# Patient Record
Sex: MALE | Race: WHITE
[De-identification: names, ages, dates, MRNs, and addresses within clinical notes are randomized per-mention and may not be internally consistent; named-entity substitution may affect disease eponyms.]

---

## 2020-06-30 ENCOUNTER — HOSPITAL ENCOUNTER (EMERGENCY)
Dept: HOSPITAL 96 - M.ERS | Age: 80
Discharge: HOME | End: 2020-06-30
Payer: COMMERCIAL

## 2020-06-30 VITALS — DIASTOLIC BLOOD PRESSURE: 92 MMHG | SYSTOLIC BLOOD PRESSURE: 145 MMHG

## 2020-06-30 VITALS — WEIGHT: 144.09 LBS | HEIGHT: 73 IN | BODY MASS INDEX: 19.1 KG/M2

## 2020-06-30 DIAGNOSIS — G89.29: ICD-10-CM

## 2020-06-30 DIAGNOSIS — F17.210: ICD-10-CM
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
## 2020-06-30 DIAGNOSIS — E87.6: ICD-10-CM

## 2020-06-30 DIAGNOSIS — E83.42: ICD-10-CM
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
## 2020-06-30 DIAGNOSIS — S32.040A: Primary | ICD-10-CM

## 2020-06-30 DIAGNOSIS — Z88.0: ICD-10-CM

## 2020-06-30 DIAGNOSIS — J18.8: ICD-10-CM

## 2020-06-30 DIAGNOSIS — F10.10: ICD-10-CM
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
## 2020-06-30 DIAGNOSIS — I10: ICD-10-CM
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
## 2020-06-30 DIAGNOSIS — S10.83XA: ICD-10-CM
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
## 2020-06-30 LAB
ABSOLUTE BASOPHILS: 0 THOU/UL (ref 0–0.2)
ABSOLUTE EOSINOPHILS: 0 THOU/UL (ref 0–0.7)
ABSOLUTE MONOCYTES: 0.5 THOU/UL (ref 0–1.2)
ALBUMIN SERPL-MCNC: 2.7 G/DL (ref 3.4–5)
ALP SERPL-CCNC: 58 U/L (ref 46–116)
ALT SERPL-CCNC: 21 U/L (ref 30–65)
ANION GAP SERPL CALC-SCNC: 5 MMOL/L (ref 7–16)
APAP SERPL-MCNC: < 2 UG/ML (ref 10–30)
APTT BLD: 26.1 SECONDS (ref 25–31.3)
AST SERPL-CCNC: 36 U/L (ref 15–37)
BASOPHILS NFR BLD AUTO: 1.1 %
BILIRUB SERPL-MCNC: 1.3 MG/DL
BUN SERPL-MCNC: 9 MG/DL (ref 7–18)
CALCIUM SERPL-MCNC: 7.2 MG/DL (ref 8.5–10.1)
CHLORIDE SERPL-SCNC: 93 MMOL/L (ref 98–107)
CO2 SERPL-SCNC: 31 MMOL/L (ref 21–32)
CREAT SERPL-MCNC: 1.2 MG/DL (ref 0.6–1.3)
EOSINOPHIL NFR BLD: 0.6 %
ETHANOL SERPL-MCNC: < 10 MG/DL (ref ?–10)
GLUCOSE SERPL-MCNC: 135 MG/DL (ref 70–99)
GRANULOCYTES NFR BLD MANUAL: 73.5 %
HCT VFR BLD CALC: 30.9 % (ref 42–52)
HGB BLD-MCNC: 11.1 GM/DL (ref 14–18)
INR PPP: 1.2
LG PLATELETS BLD QL SMEAR: (no result)
LIPASE: 119 U/L (ref 73–393)
LYMPHOCYTES # BLD: 0.6 THOU/UL (ref 0.8–5.3)
LYMPHOCYTES NFR BLD AUTO: 13 %
MACROCYTES: (no result)
MAGNESIUM SERPL-MCNC: 0.9 MG/DL (ref 1.8–2.4)
MCH RBC QN AUTO: 40.6 PG (ref 26–34)
MCHC RBC AUTO-ENTMCNC: 35.9 G/DL (ref 28–37)
MCV RBC: 113 FL (ref 80–100)
MONOCYTES NFR BLD: 11.8 %
MPV: 7.3 FL. (ref 7.2–11.1)
NEUTROPHILS # BLD: 3.1 THOU/UL (ref 1.6–8.1)
NUCLEATED RBCS: 0 /100WBC
PHOSPHATE SERPL-MCNC: 2.7 MG/DL (ref 2.5–4.9)
PLATELET # BLD EST: ADEQUATE 10*3/UL
PLATELET COUNT*: 296 THOU/UL (ref 150–400)
POTASSIUM SERPL-SCNC: 3.1 MMOL/L (ref 3.5–5.1)
PROT SERPL-MCNC: 6.1 G/DL (ref 6.4–8.2)
PROTHROMBIN TIME: 12 SECONDS (ref 9.2–11.5)
RBC # BLD AUTO: 2.74 MIL/UL (ref 4.5–6)
RDW-CV: 14.9 % (ref 10.5–14.5)
SALICYLATES SERPL-MCNC: < 2.8 MG/DL (ref 2.8–20)
SODIUM SERPL-SCNC: 129 MMOL/L (ref 136–145)
WBC # BLD AUTO: 4.3 THOU/UL (ref 4–11)

## 2020-07-08 ENCOUNTER — HOSPITAL ENCOUNTER (INPATIENT)
Dept: HOSPITAL 96 - M.ERS | Age: 80
LOS: 12 days | Discharge: PSYCHIATRIC OR SUBSTANCE USE FACILITY | DRG: 70 | End: 2020-07-20
Attending: INTERNAL MEDICINE | Admitting: INTERNAL MEDICINE
Payer: COMMERCIAL

## 2020-07-08 VITALS — DIASTOLIC BLOOD PRESSURE: 101 MMHG | SYSTOLIC BLOOD PRESSURE: 179 MMHG

## 2020-07-08 VITALS — WEIGHT: 131.6 LBS | BODY MASS INDEX: 17.44 KG/M2 | HEIGHT: 72.99 IN

## 2020-07-08 VITALS — SYSTOLIC BLOOD PRESSURE: 160 MMHG | DIASTOLIC BLOOD PRESSURE: 97 MMHG

## 2020-07-08 DIAGNOSIS — R53.1: ICD-10-CM

## 2020-07-08 DIAGNOSIS — G89.29: ICD-10-CM
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
## 2020-07-08 DIAGNOSIS — I10: ICD-10-CM

## 2020-07-08 DIAGNOSIS — G93.41: Primary | ICD-10-CM

## 2020-07-08 DIAGNOSIS — E53.8: ICD-10-CM

## 2020-07-08 DIAGNOSIS — Z91.14: ICD-10-CM

## 2020-07-08 DIAGNOSIS — E83.42: ICD-10-CM

## 2020-07-08 DIAGNOSIS — Z87.891: ICD-10-CM

## 2020-07-08 DIAGNOSIS — F10.97: ICD-10-CM

## 2020-07-08 DIAGNOSIS — Z79.899: ICD-10-CM

## 2020-07-08 DIAGNOSIS — Z20.828: ICD-10-CM

## 2020-07-08 DIAGNOSIS — M48.02: ICD-10-CM

## 2020-07-08 DIAGNOSIS — M54.9: ICD-10-CM

## 2020-07-08 DIAGNOSIS — Z88.0: ICD-10-CM

## 2020-07-08 DIAGNOSIS — E43: ICD-10-CM

## 2020-07-08 LAB
ABSOLUTE BASOPHILS: 0 THOU/UL (ref 0–0.2)
ABSOLUTE EOSINOPHILS: 0.1 THOU/UL (ref 0–0.7)
ABSOLUTE MONOCYTES: 0.6 THOU/UL (ref 0–1.2)
ALBUMIN SERPL-MCNC: 2.4 G/DL (ref 3.4–5)
ALP SERPL-CCNC: 69 U/L (ref 46–116)
ALT SERPL-CCNC: 18 U/L (ref 30–65)
ANION GAP SERPL CALC-SCNC: 9 MMOL/L (ref 7–16)
AST SERPL-CCNC: 40 U/L (ref 15–37)
BASOPHILS NFR BLD AUTO: 0.6 %
BILIRUB SERPL-MCNC: 0.9 MG/DL
BUN SERPL-MCNC: 17 MG/DL (ref 7–18)
CALCIUM SERPL-MCNC: 8.2 MG/DL (ref 8.5–10.1)
CHLORIDE SERPL-SCNC: 97 MMOL/L (ref 98–107)
CO2 SERPL-SCNC: 28 MMOL/L (ref 21–32)
CREAT SERPL-MCNC: 1.3 MG/DL (ref 0.6–1.3)
EOSINOPHIL NFR BLD: 1.1 %
GLUCOSE SERPL-MCNC: 116 MG/DL (ref 70–99)
GRANULOCYTES NFR BLD MANUAL: 76.6 %
HCT VFR BLD CALC: 36.2 % (ref 42–52)
HGB BLD-MCNC: 12.7 GM/DL (ref 14–18)
LG PLATELETS BLD QL SMEAR: (no result)
LYMPHOCYTES # BLD: 0.6 THOU/UL (ref 0.8–5.3)
LYMPHOCYTES NFR BLD AUTO: 11 %
MACROCYTES: (no result)
MAGNESIUM SERPL-MCNC: 1.7 MG/DL (ref 1.8–2.4)
MCH RBC QN AUTO: 39.8 PG (ref 26–34)
MCHC RBC AUTO-ENTMCNC: 35 G/DL (ref 28–37)
MCV RBC: 113.7 FL (ref 80–100)
MONOCYTES NFR BLD: 10.7 %
MPV: 7.6 FL. (ref 7.2–11.1)
NEUTROPHILS # BLD: 4 THOU/UL (ref 1.6–8.1)
NT-PRO BRAIN NAT PEPTIDE: 1094 PG/ML (ref ?–300)
NUCLEATED RBCS: 0 /100WBC
PLATELET # BLD EST: ADEQUATE 10*3/UL
PLATELET COUNT*: 366 THOU/UL (ref 150–400)
POTASSIUM SERPL-SCNC: 4.2 MMOL/L (ref 3.5–5.1)
PROT SERPL-MCNC: 5.8 G/DL (ref 6.4–8.2)
RBC # BLD AUTO: 3.19 MIL/UL (ref 4.5–6)
RDW-CV: 14.9 % (ref 10.5–14.5)
SODIUM SERPL-SCNC: 134 MMOL/L (ref 136–145)
WBC # BLD AUTO: 5.2 THOU/UL (ref 4–11)

## 2020-07-09 VITALS — SYSTOLIC BLOOD PRESSURE: 122 MMHG | DIASTOLIC BLOOD PRESSURE: 71 MMHG

## 2020-07-09 VITALS — SYSTOLIC BLOOD PRESSURE: 169 MMHG | DIASTOLIC BLOOD PRESSURE: 112 MMHG

## 2020-07-09 VITALS — SYSTOLIC BLOOD PRESSURE: 132 MMHG | DIASTOLIC BLOOD PRESSURE: 87 MMHG

## 2020-07-09 VITALS — DIASTOLIC BLOOD PRESSURE: 84 MMHG | SYSTOLIC BLOOD PRESSURE: 138 MMHG

## 2020-07-09 VITALS — DIASTOLIC BLOOD PRESSURE: 83 MMHG | SYSTOLIC BLOOD PRESSURE: 147 MMHG

## 2020-07-09 VITALS — SYSTOLIC BLOOD PRESSURE: 142 MMHG | DIASTOLIC BLOOD PRESSURE: 96 MMHG

## 2020-07-09 LAB
AMMONIA PLAS-SCNC: 15 UMOL/L (ref 11–32)
ANION GAP SERPL CALC-SCNC: 5 MMOL/L (ref 7–16)
BE: 1.5 MMOL/L
BUN SERPL-MCNC: 13 MG/DL (ref 7–18)
CALCIUM SERPL-MCNC: 7.8 MG/DL (ref 8.5–10.1)
CALCIUM SERPL-MCNC: 8.2 MG/DL (ref 8.5–10.1)
CHLORIDE SERPL-SCNC: 98 MMOL/L (ref 98–107)
CO2 SERPL-SCNC: 31 MMOL/L (ref 21–32)
CREAT SERPL-MCNC: 1.2 MG/DL (ref 0.6–1.3)
GLUCOSE SERPL-MCNC: 151 MG/DL (ref 70–99)
HCT VFR BLD CALC: 34.3 % (ref 42–52)
HGB BLD-MCNC: 12.1 GM/DL (ref 14–18)
INR PPP: 1.1
MCH RBC QN AUTO: 39.8 PG (ref 26–34)
MCHC RBC AUTO-ENTMCNC: 35.1 G/DL (ref 28–37)
MCV RBC: 113.3 FL (ref 80–100)
MPV: 7.4 FL. (ref 7.2–11.1)
NT-PRO BRAIN NAT PEPTIDE: 1583 PG/ML (ref ?–300)
PCO2 BLD: 38.9 MMHG (ref 35–45)
PHOSPHATE SERPL-MCNC: 2.7 MG/DL (ref 2.5–4.9)
PLATELET COUNT*: 368 THOU/UL (ref 150–400)
PO2 BLD: 70.9 MMHG (ref 75–100)
POTASSIUM SERPL-SCNC: 3.4 MMOL/L (ref 3.5–5.1)
PROTHROMBIN TIME: 11.6 SECONDS (ref 9.2–11.5)
RBC # BLD AUTO: 3.03 MIL/UL (ref 4.5–6)
RDW-CV: 14.6 % (ref 10.5–14.5)
SODIUM SERPL-SCNC: 134 MMOL/L (ref 136–145)
WBC # BLD AUTO: 6.3 THOU/UL (ref 4–11)

## 2020-07-09 NOTE — EKG
[place] [address]
Phone:  [phone]                     ELECTROCARDIOGRAM REPORT      
_______________________________________________________________________________
 
Name:         ESDRAS SHETTY                Room:          [place] [address]    ADM IN 
[facility].#:    [id number]     Account #:     [id number]  
Admission:    20    Attend Phys:   Librado Mathew, 
Discharge:                Date of Birth: [birth year]  
Date of Service: 20  Report #:      [id number]
        [id number]
_______________________________________________________________________________
THIS REPORT FOR:  //name//                      
 
                         [facility] ED
                                       
Test Date:    2020               Test Time:    20:34:38
Pat Name:     ESDRAS SHETTY             Department:   
Patient ID:   [id number]            Room:         [facility]
Gender:       M                        Technician:   [facility]
:          1940               Requested By: Gui Mauro
Order Number: [id number] MD:   Armen Borden
                                 Measurements
Intervals                              Axis          
Rate:         112                      P:            79
GA:           147                      QRS:          86
QRSD:         80                       T:            68
QT:           358                                    
QTc:          489                                    
                           Interpretive Statements
Sinus tachycardia
Atrial premature complex
Borderline right axis deviation
Low voltage, extremity and precordial leads
Borderline prolonged QT interval
No previous ECG available for comparison
Electronically Signed On 2020 12:42:43 CDT by Armen Borden
[email or web address]
 
 
 
 
 
 
 
 
 
 
 
 
 
 
 
 
 
 
  <ELECTRONICALLY SIGNED>
                                           By: Armen Borden MD, Merged with [facility]     
  20     1242
D: 204   _____________________________________
T: 204   Armen Borden MD, Merged with [facility]       /EPI

## 2020-07-09 NOTE — 2DMMODE
[place] [address]
Phone:  [phone] 2 D/M-MODE ECHOCARDIOGRAM     
_______________________________________________________________________________
 
Name:         ESDRAS SHETTY                Room:          [address] [address]    ADM IN 
R.#:    [id number]     Account #:     [id number]  
Admission:    07/08/20    Attend Phys:   Librado Mathew, 
Discharge:                Date of Birth: [birth year]  
Date of Service: 07/09/20 1616  Report #:      [id number]
        [id number]
_______________________________________________________________________________
THIS REPORT FOR:
 
cc:  Moreno Jones,Moreno Fitzpatrick,Armen QUILES MD [facility]       
                                                                       ~
 
--------------- APPROVED REPORT --------------
 
 
Study performed:  07/09/2020 13:34:30
 
EXAM: Comprehensive 2D, Doppler, and color-flow 
Echocardiogram 
Patient Location: In-Patient   
Room #:  [facility]     Status:  routine
 
      BSA:         1.89
HR: 113 bpm BP:          122/71 mmHg 
Rhythm: NSR     
 
Other Information 
Study Quality: Adequate
Technically limited study due to  off axis 
imaging.
 
Indications
Dyspnea 
 
Pulmonary Valve
PV Peak Trino.:  1.27 m/s PV Peak Gr.:  6.46 mmHg
 
Left Ventricle
The left ventricle is normal size. There is normal LV segmental wall 
motion. There is normal left ventricular wall thickness. The left 
ventricular systolic function is normal. The left ventricular 
ejection fraction is within the normal range. LVEF is 65-70%. This 
study is not technically sufficient to allow evaluation of the LV 
diastolic function.
 
Right Ventricle
The right ventricle is normal size. The right ventricular systolic 
function is normal.
 
Atria
The left atrium size is normal. The right atrium size is 
 
 
[facility]
[address] [place] [address]
Phone:  [phone] 2 D/M-MODE ECHOCARDIOGRAM     
_______________________________________________________________________________
 
Name:         ESDRAS SHETTY                Room:          [address] [place] 
.R.#:    [id number]     Account #:     [id number]  
Admission:    07/08/20    Attend Phys:   Librado Mathew, 
Discharge:                Date of Birth: [birth year]  
Date of Service: 07/09/20 1616  Report #:      [id number]
        [id number]
_______________________________________________________________________________
normal.
 
Aortic Valve
The aortic valve is normal in structure. No aortic regurgitation is 
present. There is no aortic valvular stenosis.
 
Mitral Valve
The mitral valve is normal in structure. There is no mitral valve 
regurgitation noted. No evidence of mitral valve stenosis.
 
Tricuspid Valve
The tricuspid valve is normal in structure. Unable to assess PA 
pressure. Trace tricuspid regurgitation.
 
Pulmonic Valve
Pulmonic valve is not well visualized.
 
Great Vessels
The aortic root is normal in size. IVC is normal in size and 
collapses >50% with inspiration.
 
Pericardium
There is no pericardial effusion.
 
<Conclusion>
The left ventricle is normal size.
There is normal left ventricular wall thickness.
The left ventricular systolic function is normal.
The left ventricular ejection fraction is within the normal 
range.
LVEF is 65-70%.
The right ventricle is normal size.
The left atrium size is normal.
The aortic valve is normal in structure.
The mitral valve is normal in structure.
IVC is normal in size and collapses >50% with inspiration.
There is no pericardial effusion.
There is normal LV segmental wall motion.
 
 
 
 
 
 
  <ELECTRONICALLY SIGNED>
                                           By: Armen Borden MD, [facility]     
  07/09/20     1616
D: 07/09/20 1616   _____________________________________
T: 07/09/20 1616   Armen Borden MD, FAC       /INF

## 2020-07-10 VITALS — SYSTOLIC BLOOD PRESSURE: 143 MMHG | DIASTOLIC BLOOD PRESSURE: 94 MMHG

## 2020-07-10 VITALS — SYSTOLIC BLOOD PRESSURE: 156 MMHG | DIASTOLIC BLOOD PRESSURE: 101 MMHG

## 2020-07-10 VITALS — SYSTOLIC BLOOD PRESSURE: 138 MMHG | DIASTOLIC BLOOD PRESSURE: 98 MMHG

## 2020-07-10 VITALS — SYSTOLIC BLOOD PRESSURE: 147 MMHG | DIASTOLIC BLOOD PRESSURE: 100 MMHG

## 2020-07-10 VITALS — SYSTOLIC BLOOD PRESSURE: 130 MMHG | DIASTOLIC BLOOD PRESSURE: 74 MMHG

## 2020-07-10 LAB
ANION GAP SERPL CALC-SCNC: 13 MMOL/L (ref 7–16)
BILIRUB UR-MCNC: NEGATIVE MG/DL
BUN SERPL-MCNC: 14 MG/DL (ref 7–18)
CALCIUM SERPL-MCNC: 8.5 MG/DL (ref 8.5–10.1)
CHLORIDE SERPL-SCNC: 99 MMOL/L (ref 98–107)
CO2 SERPL-SCNC: 23 MMOL/L (ref 21–32)
COLOR UR: YELLOW
CREAT SERPL-MCNC: 1.1 MG/DL (ref 0.6–1.3)
FINE GRAN CASTS #/AREA URNS LPF: (no result) /LPF
GLUCOSE SERPL-MCNC: 138 MG/DL (ref 70–99)
HYALINE CASTS #/AREA URNS LPF: (no result) /LPF
KETONES UR STRIP-MCNC: NEGATIVE MG/DL
MAGNESIUM SERPL-MCNC: 1.5 MG/DL (ref 1.8–2.4)
MUCUS: (no result) STRN/LPF
OPIATES UR-MCNC: POSITIVE NG/ML
PHOSPHATE SERPL-MCNC: 3.1 MG/DL (ref 2.5–4.9)
POTASSIUM SERPL-SCNC: 3.6 MMOL/L (ref 3.5–5.1)
PROT UR QL STRIP: NEGATIVE
RBC # UR STRIP: (no result) /UL
RBC #/AREA URNS HPF: (no result) /HPF (ref 0–2)
SODIUM SERPL-SCNC: 135 MMOL/L (ref 136–145)
SP GR UR STRIP: 1.01 (ref 1–1.03)
SQUAMOUS: (no result) /LPF (ref 0–3)
URINE CLARITY: CLEAR
URINE GLUCOSE-RANDOM: NEGATIVE
URINE LEUKOCYTES-REFLEX: NEGATIVE
URINE NITRITE-REFLEX: NEGATIVE
URINE WBC-REFLEX: (no result) /HPF (ref 0–5)
UROBILINOGEN UR STRIP-ACNC: 0.2 E.U./DL (ref 0.2–1)

## 2020-07-11 VITALS — SYSTOLIC BLOOD PRESSURE: 126 MMHG | DIASTOLIC BLOOD PRESSURE: 74 MMHG

## 2020-07-11 VITALS — DIASTOLIC BLOOD PRESSURE: 58 MMHG | SYSTOLIC BLOOD PRESSURE: 106 MMHG

## 2020-07-11 VITALS — SYSTOLIC BLOOD PRESSURE: 127 MMHG | DIASTOLIC BLOOD PRESSURE: 72 MMHG

## 2020-07-11 VITALS — SYSTOLIC BLOOD PRESSURE: 108 MMHG | DIASTOLIC BLOOD PRESSURE: 68 MMHG

## 2020-07-11 VITALS — DIASTOLIC BLOOD PRESSURE: 85 MMHG | SYSTOLIC BLOOD PRESSURE: 137 MMHG

## 2020-07-11 VITALS — DIASTOLIC BLOOD PRESSURE: 71 MMHG | SYSTOLIC BLOOD PRESSURE: 117 MMHG

## 2020-07-11 LAB
ALBUMIN SERPL-MCNC: 2.2 G/DL (ref 3.4–5)
ALP SERPL-CCNC: 64 U/L (ref 46–116)
ALT SERPL-CCNC: 21 U/L (ref 30–65)
ANION GAP SERPL CALC-SCNC: 6 MMOL/L (ref 7–16)
AST SERPL-CCNC: 37 U/L (ref 15–37)
BILIRUB SERPL-MCNC: 0.8 MG/DL
BUN SERPL-MCNC: 20 MG/DL (ref 7–18)
CALCIUM SERPL-MCNC: 8.5 MG/DL (ref 8.5–10.1)
CHLORIDE SERPL-SCNC: 97 MMOL/L (ref 98–107)
CO2 SERPL-SCNC: 33 MMOL/L (ref 21–32)
CREAT SERPL-MCNC: 1.3 MG/DL (ref 0.6–1.3)
GLUCOSE SERPL-MCNC: 135 MG/DL (ref 70–99)
MAGNESIUM SERPL-MCNC: 2.2 MG/DL (ref 1.8–2.4)
POTASSIUM SERPL-SCNC: 3.4 MMOL/L (ref 3.5–5.1)
PROT SERPL-MCNC: 6.5 G/DL (ref 6.4–8.2)
SODIUM SERPL-SCNC: 136 MMOL/L (ref 136–145)

## 2020-07-12 VITALS — SYSTOLIC BLOOD PRESSURE: 103 MMHG | DIASTOLIC BLOOD PRESSURE: 70 MMHG

## 2020-07-12 VITALS — DIASTOLIC BLOOD PRESSURE: 76 MMHG | SYSTOLIC BLOOD PRESSURE: 118 MMHG

## 2020-07-12 VITALS — SYSTOLIC BLOOD PRESSURE: 135 MMHG | DIASTOLIC BLOOD PRESSURE: 77 MMHG

## 2020-07-12 VITALS — SYSTOLIC BLOOD PRESSURE: 146 MMHG | DIASTOLIC BLOOD PRESSURE: 88 MMHG

## 2020-07-12 VITALS — SYSTOLIC BLOOD PRESSURE: 141 MMHG | DIASTOLIC BLOOD PRESSURE: 90 MMHG

## 2020-07-12 VITALS — SYSTOLIC BLOOD PRESSURE: 123 MMHG | DIASTOLIC BLOOD PRESSURE: 76 MMHG

## 2020-07-12 LAB
ALBUMIN SERPL-MCNC: 2.3 G/DL (ref 3.4–5)
ANION GAP SERPL CALC-SCNC: 11 MMOL/L (ref 7–16)
BUN SERPL-MCNC: 25 MG/DL (ref 7–18)
CALCIUM SERPL-MCNC: 8.3 MG/DL (ref 8.5–10.1)
CHLORIDE SERPL-SCNC: 101 MMOL/L (ref 98–107)
CO2 SERPL-SCNC: 25 MMOL/L (ref 21–32)
CREAT SERPL-MCNC: 1.3 MG/DL (ref 0.6–1.3)
GLUCOSE SERPL-MCNC: 126 MG/DL (ref 70–99)
MAGNESIUM SERPL-MCNC: 2.2 MG/DL (ref 1.8–2.4)
PHOSPHATE SERPL-MCNC: 3.5 MG/DL (ref 2.5–4.9)
POTASSIUM SERPL-SCNC: 4.1 MMOL/L (ref 3.5–5.1)
SODIUM SERPL-SCNC: 137 MMOL/L (ref 136–145)

## 2020-07-13 VITALS — DIASTOLIC BLOOD PRESSURE: 81 MMHG | SYSTOLIC BLOOD PRESSURE: 131 MMHG

## 2020-07-13 VITALS — SYSTOLIC BLOOD PRESSURE: 135 MMHG | DIASTOLIC BLOOD PRESSURE: 85 MMHG

## 2020-07-13 VITALS — SYSTOLIC BLOOD PRESSURE: 132 MMHG | DIASTOLIC BLOOD PRESSURE: 79 MMHG

## 2020-07-13 VITALS — DIASTOLIC BLOOD PRESSURE: 97 MMHG | SYSTOLIC BLOOD PRESSURE: 143 MMHG

## 2020-07-13 VITALS — DIASTOLIC BLOOD PRESSURE: 67 MMHG | SYSTOLIC BLOOD PRESSURE: 115 MMHG

## 2020-07-13 VITALS — DIASTOLIC BLOOD PRESSURE: 66 MMHG | SYSTOLIC BLOOD PRESSURE: 153 MMHG

## 2020-07-14 VITALS — DIASTOLIC BLOOD PRESSURE: 98 MMHG | SYSTOLIC BLOOD PRESSURE: 139 MMHG

## 2020-07-14 VITALS — DIASTOLIC BLOOD PRESSURE: 86 MMHG | SYSTOLIC BLOOD PRESSURE: 138 MMHG

## 2020-07-14 VITALS — SYSTOLIC BLOOD PRESSURE: 141 MMHG | DIASTOLIC BLOOD PRESSURE: 84 MMHG

## 2020-07-14 VITALS — SYSTOLIC BLOOD PRESSURE: 156 MMHG | DIASTOLIC BLOOD PRESSURE: 97 MMHG

## 2020-07-14 VITALS — SYSTOLIC BLOOD PRESSURE: 168 MMHG | DIASTOLIC BLOOD PRESSURE: 105 MMHG

## 2020-07-14 VITALS — SYSTOLIC BLOOD PRESSURE: 123 MMHG | DIASTOLIC BLOOD PRESSURE: 81 MMHG

## 2020-07-15 VITALS — DIASTOLIC BLOOD PRESSURE: 90 MMHG | SYSTOLIC BLOOD PRESSURE: 134 MMHG

## 2020-07-15 VITALS — SYSTOLIC BLOOD PRESSURE: 140 MMHG | DIASTOLIC BLOOD PRESSURE: 98 MMHG

## 2020-07-15 VITALS — SYSTOLIC BLOOD PRESSURE: 112 MMHG | DIASTOLIC BLOOD PRESSURE: 74 MMHG

## 2020-07-15 VITALS — SYSTOLIC BLOOD PRESSURE: 135 MMHG | DIASTOLIC BLOOD PRESSURE: 83 MMHG

## 2020-07-15 VITALS — DIASTOLIC BLOOD PRESSURE: 88 MMHG | SYSTOLIC BLOOD PRESSURE: 124 MMHG

## 2020-07-15 LAB
ABSOLUTE BASOPHILS: 0.1 THOU/UL (ref 0–0.2)
ABSOLUTE EOSINOPHILS: 0.1 THOU/UL (ref 0–0.7)
ABSOLUTE MONOCYTES: 0.9 THOU/UL (ref 0–1.2)
ANION GAP SERPL CALC-SCNC: 14 MMOL/L (ref 7–16)
ANISOCYTOSIS BLD QL SMEAR: (no result)
BASOPHILS NFR BLD AUTO: 0.8 %
BUN SERPL-MCNC: 25 MG/DL (ref 7–18)
CALCIUM SERPL-MCNC: 9.5 MG/DL (ref 8.5–10.1)
CHLORIDE SERPL-SCNC: 97 MMOL/L (ref 98–107)
CO2 SERPL-SCNC: 24 MMOL/L (ref 21–32)
CREAT SERPL-MCNC: 1.2 MG/DL (ref 0.6–1.3)
EOSINOPHIL NFR BLD: 1.4 %
GLUCOSE SERPL-MCNC: 98 MG/DL (ref 70–99)
GRANULOCYTES NFR BLD MANUAL: 74.7 %
HCT VFR BLD CALC: 35.4 % (ref 42–52)
HGB BLD-MCNC: 12.4 GM/DL (ref 14–18)
LYMPHOCYTES # BLD: 0.9 THOU/UL (ref 0.8–5.3)
LYMPHOCYTES NFR BLD AUTO: 11.5 %
MCH RBC QN AUTO: 38.7 PG (ref 26–34)
MCHC RBC AUTO-ENTMCNC: 35 G/DL (ref 28–37)
MCV RBC: 110.5 FL (ref 80–100)
MONOCYTES NFR BLD: 11.6 %
MPV: 8.4 FL. (ref 7.2–11.1)
NEUTROPHILS # BLD: 5.7 THOU/UL (ref 1.6–8.1)
NUCLEATED RBCS: 0 /100WBC
PLATELET COUNT*: 389 THOU/UL (ref 150–400)
POTASSIUM SERPL-SCNC: 2.9 MMOL/L (ref 3.5–5.1)
RBC # BLD AUTO: 3.2 MIL/UL (ref 4.5–6)
RDW-CV: 14.7 % (ref 10.5–14.5)
SODIUM SERPL-SCNC: 135 MMOL/L (ref 136–145)
WBC # BLD AUTO: 7.7 THOU/UL (ref 4–11)

## 2020-07-16 VITALS — DIASTOLIC BLOOD PRESSURE: 90 MMHG | SYSTOLIC BLOOD PRESSURE: 148 MMHG

## 2020-07-16 VITALS — DIASTOLIC BLOOD PRESSURE: 85 MMHG | SYSTOLIC BLOOD PRESSURE: 137 MMHG

## 2020-07-16 VITALS — DIASTOLIC BLOOD PRESSURE: 79 MMHG | SYSTOLIC BLOOD PRESSURE: 133 MMHG

## 2020-07-16 VITALS — DIASTOLIC BLOOD PRESSURE: 92 MMHG | SYSTOLIC BLOOD PRESSURE: 141 MMHG

## 2020-07-16 VITALS — SYSTOLIC BLOOD PRESSURE: 144 MMHG | DIASTOLIC BLOOD PRESSURE: 107 MMHG

## 2020-07-16 VITALS — DIASTOLIC BLOOD PRESSURE: 88 MMHG | SYSTOLIC BLOOD PRESSURE: 127 MMHG

## 2020-07-17 VITALS — DIASTOLIC BLOOD PRESSURE: 86 MMHG | SYSTOLIC BLOOD PRESSURE: 123 MMHG

## 2020-07-17 VITALS — SYSTOLIC BLOOD PRESSURE: 156 MMHG | DIASTOLIC BLOOD PRESSURE: 95 MMHG

## 2020-07-17 VITALS — DIASTOLIC BLOOD PRESSURE: 90 MMHG | SYSTOLIC BLOOD PRESSURE: 137 MMHG

## 2020-07-18 VITALS — DIASTOLIC BLOOD PRESSURE: 82 MMHG | SYSTOLIC BLOOD PRESSURE: 115 MMHG

## 2020-07-18 VITALS — SYSTOLIC BLOOD PRESSURE: 132 MMHG | DIASTOLIC BLOOD PRESSURE: 78 MMHG

## 2020-07-18 VITALS — SYSTOLIC BLOOD PRESSURE: 115 MMHG | DIASTOLIC BLOOD PRESSURE: 82 MMHG

## 2020-07-18 VITALS — DIASTOLIC BLOOD PRESSURE: 78 MMHG | SYSTOLIC BLOOD PRESSURE: 120 MMHG

## 2020-07-19 VITALS — DIASTOLIC BLOOD PRESSURE: 81 MMHG | SYSTOLIC BLOOD PRESSURE: 124 MMHG

## 2020-07-19 VITALS — SYSTOLIC BLOOD PRESSURE: 111 MMHG | DIASTOLIC BLOOD PRESSURE: 81 MMHG

## 2020-07-19 VITALS — SYSTOLIC BLOOD PRESSURE: 133 MMHG | DIASTOLIC BLOOD PRESSURE: 82 MMHG

## 2020-07-20 ENCOUNTER — HOSPITAL ENCOUNTER (INPATIENT)
Dept: HOSPITAL 35 - SBH | Age: 80
LOS: 4 days | Discharge: HOME HEALTH | DRG: 884 | End: 2020-07-24
Attending: PSYCHIATRY & NEUROLOGY | Admitting: PSYCHIATRY & NEUROLOGY
Payer: COMMERCIAL

## 2020-07-20 VITALS — DIASTOLIC BLOOD PRESSURE: 81 MMHG | SYSTOLIC BLOOD PRESSURE: 128 MMHG

## 2020-07-20 VITALS — SYSTOLIC BLOOD PRESSURE: 115 MMHG | DIASTOLIC BLOOD PRESSURE: 82 MMHG

## 2020-07-20 VITALS — SYSTOLIC BLOOD PRESSURE: 92 MMHG | DIASTOLIC BLOOD PRESSURE: 60 MMHG

## 2020-07-20 VITALS — HEIGHT: 72 IN | BODY MASS INDEX: 16.38 KG/M2 | WEIGHT: 120.9 LBS

## 2020-07-20 VITALS — DIASTOLIC BLOOD PRESSURE: 82 MMHG | SYSTOLIC BLOOD PRESSURE: 115 MMHG

## 2020-07-20 VITALS — DIASTOLIC BLOOD PRESSURE: 82 MMHG | SYSTOLIC BLOOD PRESSURE: 116 MMHG

## 2020-07-20 DIAGNOSIS — Z91.14: ICD-10-CM

## 2020-07-20 DIAGNOSIS — I10: ICD-10-CM

## 2020-07-20 DIAGNOSIS — G93.40: ICD-10-CM

## 2020-07-20 DIAGNOSIS — E43: ICD-10-CM

## 2020-07-20 DIAGNOSIS — F01.51: ICD-10-CM

## 2020-07-20 DIAGNOSIS — M54.9: ICD-10-CM

## 2020-07-20 DIAGNOSIS — Z82.49: ICD-10-CM

## 2020-07-20 DIAGNOSIS — Z88.0: ICD-10-CM

## 2020-07-20 DIAGNOSIS — G89.29: ICD-10-CM

## 2020-07-20 DIAGNOSIS — F19.20: ICD-10-CM

## 2020-07-20 DIAGNOSIS — F03.91: Primary | ICD-10-CM

## 2020-07-20 PROCEDURE — 10880: CPT

## 2020-07-20 NOTE — NUR
Pt able to voice his name but not birthday. Confused. Pt was in the day room
on a salvador chair at time of assessment. Pt was pleasant and cooperative with
assessment. Pt denies pain. Pt denies anxiety and/or depression. Pt took meds
whole but chewed it. Pt currently in bed sleeping. Fall precaution in place.
Will continue to monitor.

## 2020-07-20 NOTE — NUR
ORIENTED TO NAME ONLY UPON INITIAL ASSESSMENT-DAUGHTER RICHIE SHETTY CONTACTED
VIA PHONE AND STATES SHE IS NOT DPOA BUT IS AGREEMENT WITH ADMISSION-DR. MICHAEL CONTACTED AND IS ADMITTING PT UNDER [employer] ACT.

## 2020-07-21 VITALS — DIASTOLIC BLOOD PRESSURE: 80 MMHG | SYSTOLIC BLOOD PRESSURE: 117 MMHG

## 2020-07-21 VITALS — SYSTOLIC BLOOD PRESSURE: 92 MMHG | DIASTOLIC BLOOD PRESSURE: 50 MMHG

## 2020-07-21 NOTE — NUR
GREGOR spoke with [facility] Niesha. She and the pt have lived togther for 15 years.
Niesha provided a good hx from April 2020 describing pt's increased
confusion, visual hallucinations, lack of food intake, medicationg and bribing
him with ETOH to eat. Pt has denied feeling hungry and prefers rum and coke.
He's also been on pain killers for a fall. Pt's last drink was 7/7/20. Sw
provided education about [facility] and d/c planning. She wants him back in the home
and sw reported the need for 24/7 supervision and extra support. Gregor completed
the intake assessment and TP.

## 2020-07-21 NOTE — H
[facility]
Kassi [address]fe[address]
[place]   [address]                     HISTORY AND PHYSICAL          
_______________________________________________________________________________
 
Name:       ESDRAS SHETTY                Room #:         [address]B      ADM IN  
M.R.#:      [id number]                       Account #:      [id number]  
Admission:  07/20/20    Attend Phys:    Magdi Curran DO
Discharge:              Date of Birth:  [birth year]  
                                                          Report #: [id number]
                                                                    [id number]   
_______________________________________________________________________________
THIS REPORT FOR:  
 
cc:  Moreno Jones,Moreno Acuna,Magdi NGUYEN DO                                        ~
CC: Magdi Jones
 
DATE OF SERVICE:  07/20/2020
 
 
INPATIENT PSYCHIATRIC EVALUATION
 
ATTENDING PHYSICIAN:  Magdi Curran DO.
 
MEDICAL CONSULTANT:  Jaspal Griggs M.D.
 
SOURCES OF INFORMATION:  Records from [facility], where the
patient was hospitalized from 07/08 through 07/20, and interview with the
patient.
 
CHIEF COMPLAINT:  Unspecified.
 
HISTORY OF PRESENT ILLNESS:  This is a 80-year-old  male who was
initially brought to the [facility] ER in [place] on 07/08
for altered mental status.  The patient is a very poor historian, so I utilized
 
the
initial H and P from 07/08 at [facility]
, he was brought by his daughter.  In April, he first
had hallucinations.  He thought that he was on the computer playing a game,
hallucinations came and went.  His daughter thought that when he ate food, he
did not have a problem; if he does not eat or he drinks instead, then he would
have the hallucinations.  He has a history of alcoholism.  She told him that he
needed to eat and then it would be okay for a couple of days.  By 06/30, the
daughter called 911 because he fell; he also had fallen in May, but he refused
to go to the doctor.  He was admitted at the end of June and diagnosed with
electrolyte abnormalities.  From the time he came home to admission, he spent in
his bed.  His daughter brought him food and a rum and Coke for lunch, dinner and
bedtime, so it sounds like the daughter was keeping him going through
withdrawals.  His daughter was trying to tell him to stop drinking.  His
daughter said that the ER doctor told him he could drink twice a day as long as
he ate.  The patient has told his daughter that he used to drink 1.7 liter
bottle of rum every 3 days, he was drinking 40 shots in a 3-day period; now, he
is down to a bottle a week since 06/01.  His daughter states that he has trouble
with his memory and he has had trouble with his memory for 8 years.  He also
 
 
 
[place] [address]                     HISTORY AND PHYSICAL          
_______________________________________________________________________________
 
Name:       ESDRAS SHETTY                Room #:         [address]      [facility] [place]  
.R.#:      [id number]                       Account #:      [id number]  
Admission:  07/20/20    Attend Phys:    Magdi Curran, 
Discharge:              Date of Birth:  [birth year]  
                                                          Report #: [id number]
                                                                    [id number]   
_______________________________________________________________________________
repeats himself a lot.  On interview today, the patient was unable to tell me
his daughter's name, the day of the week.  He was unclear as to why he had been
brought from [facility] to [facility].  Unable
to get other history.
 
REVIEW OF SYSTEMS:  I could not possibly hope to get a reliable 10-point review
of systems.  I pressed on his belly, his chest, his arms; he did not show clear
pain or guarding from that.
 
PAST MEDICAL HISTORY:  Includes chronic back pain, hypertension, medication
noncompliance, alcoholic dementia diagnosed at [facility];
encephalopathy, metabolic, improving; underlying dementia, alcohol use disorder,
generalized weakness, folate deficiency, hypomagnesemia, transaminitis, elevated
brain natriuretic peptide, severe protein-calorie malnutrition.
 
SOCIAL HISTORY:  Lives with his daughter, it sounds like intermittently.
 
He saw the PMR doctor.
 
He has difficulty with self-care, transfers and mobility.
 
Physical exam was grossly normal.
 
It sounds like he has been smoking for 67 years, 1/4 pack per day, at least 6 to
8 drinks per day of alcohol.
 
His discharge medications were folic acid, thiamine, vitamin D3, 
pantoprazole, potassium chloride, albuterol sulfate p.r.n. and magnesium 250 mg
p.o. daily.  They recommended against a narcotic at [facility].
 
I did not get a handle if he has withdrawal seizures, but clearly he had
withdrawal delirium.
 
ALLERGIES:  PENICILLIN.
 
The patient does not have a formal surrogate decision maker.
 
LABORATORY DATA:  Most recent laboratories from 07/15; white count 7.7, H and H
11.4 and 35.4; .5, consistent with alcoholism; and platelets count 389. 
Diff was grossly normal.  He had 1+ anisocytosis, 1+ microcytic cells.  Coags
were 11.6 on 07/09, INR 1.1, APTT 26.1.  Last chemistry on 07/15; sodium 135,
potassium 4.2, chloride 97, bicarbonate 24, anion gap 14, BUN 25, creatinine
1.2, estimated GFR 58, glucose 98, calcium 9.5.  GGT 33 on 07/09, alkaline
phosphatase 64, ammonia less than 10.  On 07/08, troponin less than 0.06.  On
07/09, albumin 2.3 which is absolutely low, prealbumin 10.2.  His weight is
55.972 kg, BMI of 16.7, which is clearly very malnourished.  TSH 0.705, T4 7.1
 
 
 
[facility]
[address]
[place]   [address]                     HISTORY AND PHYSICAL          
_______________________________________________________________________________
 
Name:       ESDRAS SHETTY                Room #:         [address]B      ADM [place]  
M.R.#:      [id number]                       Account #:      [id number]  
Admission:  07/20/20    Attend Phys:    Magdi Curran, 
Discharge:              Date of Birth:  [birth year]  
                                                          Report #: [id number]
                                                                    [id number]   
_______________________________________________________________________________
on 07/09, free T3 of 1.6.  Microbiology was done on 07/09 through the urine
culture, which was negative.  There was 1 blood culture done on 07/08, which
showed no growth.
 
PHYSICAL EXAMINATION:
VITAL SIGNS:  Today, temperature 36.8, pulse 110, respirations 18, BP 92/60,
O2 sat 97%.
MUSCULOSKELETAL:  Nonambulatory, seated in Karen chair, appearing cachectic,
grossly malnourished.
 
MENTAL STATUS EXAMINATION:  This is a well-developed, frail, ill-appearing
 male, appearing older than stated age.  Attention impaired. 
Concentration impaired.  Speech has normal rate, volume and tone.  Thought
process, linear and limited.  Thought content, fairly prominent poverty of
thought.  No psychomotor agitation or psychomotor retardation, some almost to
indifference to his deficits.  Denied suicidal or homicidal ideations.  Denied
auditory, visual or tactile hallucinations.  Only oriented to self; not to time,
situation or place.  Insight impaired, judgment impaired.  Memory not formally
tested, but known to be grossly impaired.  Fund of knowledge well below average
in baseline.
 
FORMULATION:  A 80-year-old male, inter-hospital transfer from [facility].  There was a 2-day delay due to critical staffing here in [facility].
 
DIAGNOSES:  At this time, major neurocognitive disorder, likely due to chronic
alcoholism with concomitant traumatic brain injury, severe protein-calorie
malnutrition, BMI of 16.7, indicative of starvation, failure to thrive.
 
PLAN:  We will do a brief assessment of his cognition.  The patient will require
memory care placement.  Unfortunately, unless there is a more favorable history
I can obtain from his daughter, I view his deficits as not being reversible, and
goals are certainly comfort, dignity and safety at this point.
 
Time spent on this interview, evaluation preparation was around 45 minutes.
 
STRENGTHS:  Few, perhaps that he has insurance with [employer].
 
WEAKNESSES:  Only has Medicaid.
 
ESTIMATED LENGTH OF STAY:  7 to 14 days.
 
 
 
[address]   [address]                     HISTORY AND PHYSICAL          
_______________________________________________________________________________
 
Name:       ESDRAS SHETTY                Room #:         [address]B      ADM [place]  
.R.#:      [id number]                       Account #:      [id number]  
Admission:  07/20/20    Attend Phys:    Magdi Curran DO
Discharge:              Date of Birth:  [birth year]  
                                                          Report #: [id number]
                                                                    [id number]   
_______________________________________________________________________________
 
Proceed with evaluation for stabilization on Geriatric Psychiatry.
 
 
 
 
 
 
 
 
 
 
 
 
 
 
 
 
 
 
 
 
 
 
 
 
 
 
 
 
 
 
 
 
 
 
 
 
 
 
 
 
 
 
  <ELECTRONICALLY SIGNED>
   By: Magdi Curran DO        
  07/21/20     2154
D: 07/20/20 2251                           _____________________________________
T: 07/20/20 2359                           Magdi Curran,           /nt

## 2020-07-21 NOTE — NUR
CONVERSATION FRAGMENTED-INCOHERENT AT TIMES. ORIENTED TO NAME ONLY, SOME MILD
MOOD LABILITY AS WILL BE LAUGHING AT PEERS ONE MOMENT AND THE NEXT BECOME
ANGRY STATING "OH LOOK AT HER SHE IS UPSET WITH ME-SHE HAS  DOLLAR BILL"
DID TAKE MEDICATIONS WITHOUT RESISTANCE-REQUIRES SET UP AND PROMPTING/QUEING
AT MEALS-ATE APPROX  25 PERCENT OF BREAKFAST. REQUIRES ASSIST OF TWO TO
TRANSFER TO/FROM CHAIR-INCONT. OF URINE X 2 TODAY. REMAINS ON FALSS
PRECAUTIONS FOR UNSTEADY GAIT AND IMPULSIVE BEHAVIOR-ATTEMPTING TO GET UP ON
OWN.

## 2020-07-21 NOTE — NUR
WOUND CONSULT;
ASSESSMENT OF THIS PATIENT HAS ERYTHEMA TO HIS SACRUM/BUTTOCKS AND S/S OF
FRICTION.  THE PERINEAL AREAS ARE FIERY RED.  LIKLY FUNGAL.  THIS PATIENT WAS
AT [facility] AND HAS NOT BEEN USING AN ANTIFUNGAL.  IF THIS AREA
HAS NOT IMPROVED IN 24 HOURS.  REQUEST AN ANTIFULGAL MEDICATION SUCH AS
NYSTATIN POWDER FROM THE PCP.
 
RECCOMENDATIONS;
-ANTIFUNGAL BARRIER CREAM TO PERINEAL AREAS BID
-ZGUARD TO SACRUM/BUTTOCKS BID
 
DISCUSSED WITH RN

## 2020-07-21 NOTE — NUR
DURING INCONTINENT CARE NOTED TO HAVE REDDENED,EXCORIATED AREA TO INNER
THIGHS,SCROTUM AND RECTUM,AREA CLEANSED AND SKIN APPEARS TO BE PEELING -PT
DOES REPORT BURNING "I FEEL LIKE MY ASS IS ON FIRE" NOTED TO HAVE 2 PEA SIZE
SUPERFICIAL LESIONS TO MIDLINE COCYX WHICH APPEAR TO HAVE TO BE PARTIALLY
SCABBED. AREA CLEANSED AND FOAM ADHESIVE DRESSING APPLIED TO PROVIDE
ADDITIONAL PADDING TO BONY PROMINENCE. ANTIFUNGAL CREAM APPLIED TO EXCORIATED
AREA AND LEFT OPEN TO AIR. WAFFLE PAD APPLIED TO GERICHAIR AND PT PLACED IN
BED ON LEFT SIDE.

## 2020-07-22 VITALS — DIASTOLIC BLOOD PRESSURE: 68 MMHG | SYSTOLIC BLOOD PRESSURE: 96 MMHG

## 2020-07-22 VITALS — SYSTOLIC BLOOD PRESSURE: 96 MMHG | DIASTOLIC BLOOD PRESSURE: 68 MMHG

## 2020-07-22 VITALS — SYSTOLIC BLOOD PRESSURE: 105 MMHG | DIASTOLIC BLOOD PRESSURE: 80 MMHG

## 2020-07-22 NOTE — NUR
WOUND CARE F/U;
ROUNDING TODAY ONLY TO COMMUNICATE THE ORDERS WITH THE RN.  IF SCROTUM HAS NOT
IMPROVED TODAY, GET AN ORDER FOR NYSTATIN POWDER OR THE PCP CHOICE.
 
DISCUSSED WITH RN

## 2020-07-22 NOTE — NUR
Pt was in the dayroom, cheerful at time of assessment. Pt was able to oriented
to name. Confused. Forgetful. Pt was cooperative with assessment. Meds given
crushed. Antifungal applied to pt's bottom. Pt's bottom open to air. RNP
hospitalist on call notified that pt takes protonix in the am, however pt is
unable to take meds whole. Protonix changed to pepcid. Pt currently in bed
sleeping. Will continue to monitor.

## 2020-07-22 NOTE — NUR
PATIENT HAS BEEN UP, AND OUT ON THE UNIT SITTING A RECLINER. PATIENT TOOK
MORNING MEDICATION WHOLEIN ICECREAM, WELL TOLERATED. APPETITE IS POOR, PATIENT
EATS LESS THAN 25% MEALS, DRINKING FLUID FAIRLY WELL. PATIENT IS INCONTINENT
OF BOWEL/BLADDER, HAD BOWEL MOVEMENT THIS AFTERNOON, GOOD PERICARE GIVEN BY
STAFF. DEMETRI AREA REMAIN REDDENED, BARRIER CREAM APPLIED PER ORDER. WOUND CARE
NURSE REOMMENDED LETTING DR. ROMANO KNOW TO ORDER NYSTATIN POWDER IF AREA
REMAIN REDDENED. DR. MICHAEL NOTIFIED. ORDER FOR NYSTATIN POWER TO PERIARE
BID NOTED. PATIENT PUT IN BED TO PUT PRESSURE OF BUTTOCKS, DR. ROMANO AWARE.
PATIENT IS FORGETFUL, CONFUSED, UNABLE TO APPROPRIATELY REPOND TO ASSESSMENT
QUESTIONS. PATIENT REQUIRES ASSIST OF TWO STAFF TO COMPLETE ADL. WHILE
CLEANING PATIENT, HE THREATENED STAFF " I AM GOING TO KICK SOMEONE" PATIENT
REDIRECTED, AND HE CALMED DOWN. MOOD IMPULSIVE, AFFECT IS FLAT, AND BLUNTED.
PATIENT CURRENTLY IN BED RESTING. WILL CONTINUE TO REDIRECT, AND MONITOR FOR
SAFETY.

## 2020-07-22 NOTE — NUR
Care of patient assumed at 1915.  Patient is sitting in recliner in the day
room.  Oriented to self only.  Cooperative with assessment, though unable to
answer many questions as he is not able to find words.  HS faint, LS
diminished in all fields, BS active x 4.  Takes scheduled K+ with some
difficulty.  Assisted to bed after medication is administered and he is found
to be incontinent of stool.  Cleaned up, nystatin applied as ordered, and
patient placed in bed.  At 2230 patient is found to have been incontinent of
stool again, a large amount.  Full bed change compelted as patient is cleaned
and abrrier cream applied to protect skin.  Throughout this interchange,
patient is rambling about having to do everyone else's work, as if he is on a
job site.

## 2020-07-23 VITALS — DIASTOLIC BLOOD PRESSURE: 62 MMHG | SYSTOLIC BLOOD PRESSURE: 111 MMHG

## 2020-07-23 VITALS — DIASTOLIC BLOOD PRESSURE: 69 MMHG | SYSTOLIC BLOOD PRESSURE: 99 MMHG

## 2020-07-23 NOTE — NUR
1035 RESUMMED CARE FROM OVERNIGHT SHIFT THIS AM, PATIENT IN DAY ROOM QUIET
CALM. PATIENT TOOK MEDICATION CRUSHED IN YOGART WITHOUT INCIDENCE. PATIENTS
ABDOMEN SOFT ROUND BOWEL PRESENT LUNGS CLEAR ORIENTED TO ONLY SELF ONLY.
PATIENT DENIES SI/HI/AH/VH AT PRESENT PATIENT CAALM COOPERTIVE. PATIENT HAS
REDDNESS IN GROIN AREA I PUT NYSTATIN IN AREA, WILL CONTINUE TO MONITOR
PATIENT FOR SAFETY AND BEHAVIORS.

## 2020-07-23 NOTE — NUR
Care of patient assumed at 1915.  Patient is laying in bed.  Confused and
generally disoriented, patient had stripped off his brief and soiled the bed.
Patient cleaned up and dressed.  Bedding changed complete.  Patient unable to
appreciate assessment questions with the exception of pain, which he denies.
Does allow HS, LS, BS to be assessed - all WNL.  Patient sits in a recliner in
the day room for the next 3 hrs.  Compliant with HS K+.  Assisted to bed at
2330 and weight obtained - 120.9.  Rests fitfully through the night.

## 2020-07-24 VITALS — SYSTOLIC BLOOD PRESSURE: 99 MMHG | DIASTOLIC BLOOD PRESSURE: 65 MMHG

## 2020-07-24 VITALS — DIASTOLIC BLOOD PRESSURE: 65 MMHG | SYSTOLIC BLOOD PRESSURE: 99 MMHG

## 2020-07-24 NOTE — NUR
WOUND CARE FOLLOW UP;
THE PATIENTS ESCORIATED BUTTOCK/SACRAL AREAS AND SCROTUM AND GROIN RASH ARE
HEALED.  THE PATIENTS OVERALL MENTATION IS IMPROVED.  THERE IS NO APPARENT
NEED TO CONTINUE FOLLOWING THIS PATIENT.  PLEASE RECONSULT IF NECESSSARY.
 
DISCUSSED WITH IDALMIS

## 2020-07-24 NOTE — NUR
[facility] spoke with Niesha and reported that d/c will be today using a stretcher
van with [facility]. [facility] will also set up  inlcuding pt/ot/st and [facility],
nursing for meds management. D/ will be at 2PM. reported this to nursing

## 2020-07-24 NOTE — NUR
SW called [facility], [facility], [facility], [facility], [facility], and [facility]. None
could accept this pt due to insurnace barrieres, and staffing. [facility]
accepted this tp. Gregor Sent the referral, they will start coverage on Tuesday.
Gregor checked with Dr Curran and he stated that would be fine.

## 2020-07-24 NOTE — NUR
PT SITTING IN PAIGE CHAIR. PT TOOK MAIN MEDS. PT STATING THAT THIS WRITER GETS
3 CENTS AND HE GETS 2 CENTS FOR THESE MEDS. PT LOOKS SUPSISIOUS AT STAFF. PT
NEEDS ASSISTANCE X2 STAFF WITH TRANSFERS. PT STATED HE FEELS GOOD TODAY. PT
STATED AS LONG AS I DON'T GET MY PANTS KNOCKED OFF HIM. TREMORS TO EXT. SLIGHT
[place]. DENIES PAIN, PT STATED HE WILL LET THIS WRITER KNOW IF HE IS HURTING.

## 2020-07-24 NOTE — NUR
PT TRANSPORT HERE TO TAKE HIM TO FAMILY. PT CLEANED UP AND APPLIED NEW BRIEF.
PT PLACED ON STRETCHER AND TAKEN TO VAN. PT GRIMACED WHEN TURNING. WILL GIVE
REPORT TO FAMILY.